# Patient Record
Sex: FEMALE | Race: OTHER
[De-identification: names, ages, dates, MRNs, and addresses within clinical notes are randomized per-mention and may not be internally consistent; named-entity substitution may affect disease eponyms.]

---

## 2018-04-23 ENCOUNTER — HOSPITAL ENCOUNTER (EMERGENCY)
Dept: HOSPITAL 62 - ER | Age: 43
Discharge: HOME | End: 2018-04-23
Payer: OTHER GOVERNMENT

## 2018-04-23 VITALS — SYSTOLIC BLOOD PRESSURE: 171 MMHG | DIASTOLIC BLOOD PRESSURE: 99 MMHG

## 2018-04-23 DIAGNOSIS — I10: ICD-10-CM
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
## 2018-04-23 DIAGNOSIS — M25.562: ICD-10-CM

## 2018-04-23 DIAGNOSIS — M25.532: ICD-10-CM
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
## 2018-04-23 DIAGNOSIS — S69.90XA: ICD-10-CM

## 2018-04-23 DIAGNOSIS — Z91.048: ICD-10-CM

## 2018-04-23 DIAGNOSIS — M25.531: ICD-10-CM

## 2018-04-23 DIAGNOSIS — R51: ICD-10-CM

## 2018-04-23 DIAGNOSIS — S80.212A: Primary | ICD-10-CM
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
## 2018-04-23 LAB
ADD MANUAL DIFF: NO
ALBUMIN SERPL-MCNC: 4.8 G/DL (ref 3.5–5)
ALP SERPL-CCNC: 100 U/L (ref 38–126)
ALT SERPL-CCNC: 26 U/L (ref 9–52)
ANION GAP SERPL CALC-SCNC: 17 MMOL/L (ref 5–19)
APPEARANCE UR: (no result)
APTT PPP: YELLOW S
AST SERPL-CCNC: 20 U/L (ref 14–36)
BASOPHILS # BLD AUTO: 0.1 10^3/UL (ref 0–0.2)
BASOPHILS NFR BLD AUTO: 1 % (ref 0–2)
BILIRUB DIRECT SERPL-MCNC: 0.3 MG/DL (ref 0–0.4)
BILIRUB SERPL-MCNC: 0.5 MG/DL (ref 0.2–1.3)
BILIRUB UR QL STRIP: NEGATIVE
BUN SERPL-MCNC: 15 MG/DL (ref 7–20)
CALCIUM: 10.1 MG/DL (ref 8.4–10.2)
CHLORIDE SERPL-SCNC: 103 MMOL/L (ref 98–107)
CO2 SERPL-SCNC: 25 MMOL/L (ref 22–30)
EOSINOPHIL # BLD AUTO: 0.2 10^3/UL (ref 0–0.6)
EOSINOPHIL NFR BLD AUTO: 2.4 % (ref 0–6)
ERYTHROCYTE [DISTWIDTH] IN BLOOD BY AUTOMATED COUNT: 17 % (ref 11.5–14)
GLUCOSE SERPL-MCNC: 82 MG/DL (ref 75–110)
GLUCOSE UR STRIP-MCNC: NEGATIVE MG/DL
HCT VFR BLD CALC: 35.8 % (ref 36–47)
HGB BLD-MCNC: 11.1 G/DL (ref 12–15.5)
KETONES UR STRIP-MCNC: NEGATIVE MG/DL
LYMPHOCYTES # BLD AUTO: 1.9 10^3/UL (ref 0.5–4.7)
LYMPHOCYTES NFR BLD AUTO: 27.9 % (ref 13–45)
MCH RBC QN AUTO: 21.9 PG (ref 27–33.4)
MCHC RBC AUTO-ENTMCNC: 30.9 G/DL (ref 32–36)
MCV RBC AUTO: 71 FL (ref 80–97)
MONOCYTES # BLD AUTO: 0.4 10^3/UL (ref 0.1–1.4)
MONOCYTES NFR BLD AUTO: 5.8 % (ref 3–13)
NEUTROPHILS # BLD AUTO: 4.2 10^3/UL (ref 1.7–8.2)
NEUTS SEG NFR BLD AUTO: 62.9 % (ref 42–78)
NITRITE UR QL STRIP: NEGATIVE
PH UR STRIP: 5 [PH] (ref 5–9)
PLATELET # BLD: 302 10^3/UL (ref 150–450)
POTASSIUM SERPL-SCNC: 4.2 MMOL/L (ref 3.6–5)
PROT SERPL-MCNC: 8.6 G/DL (ref 6.3–8.2)
PROT UR STRIP-MCNC: NEGATIVE MG/DL
RBC # BLD AUTO: 5.07 10^6/UL (ref 3.72–5.28)
SODIUM SERPL-SCNC: 144.5 MMOL/L (ref 137–145)
SP GR UR STRIP: 1.03
TOTAL CELLS COUNTED % (AUTO): 100 %
UROBILINOGEN UR-MCNC: 2 MG/DL (ref ?–2)
WBC # BLD AUTO: 6.7 10^3/UL (ref 4–10.5)

## 2018-04-23 PROCEDURE — 93010 ELECTROCARDIOGRAM REPORT: CPT

## 2018-04-23 PROCEDURE — 81001 URINALYSIS AUTO W/SCOPE: CPT

## 2018-04-23 PROCEDURE — 85025 COMPLETE CBC W/AUTO DIFF WBC: CPT

## 2018-04-23 PROCEDURE — 99284 EMERGENCY DEPT VISIT MOD MDM: CPT

## 2018-04-23 PROCEDURE — 80053 COMPREHEN METABOLIC PANEL: CPT

## 2018-04-23 PROCEDURE — 70450 CT HEAD/BRAIN W/O DYE: CPT

## 2018-04-23 PROCEDURE — 81025 URINE PREGNANCY TEST: CPT

## 2018-04-23 PROCEDURE — 36415 COLL VENOUS BLD VENIPUNCTURE: CPT

## 2018-04-23 PROCEDURE — 93005 ELECTROCARDIOGRAM TRACING: CPT

## 2018-04-23 NOTE — RADIOLOGY REPORT (SQ)
EXAM DESCRIPTION:  CT HEAD WITHOUT



COMPLETED DATE/TIME:  4/23/2018 3:06 pm



REASON FOR STUDY:  fall



COMPARISON:  None.



TECHNIQUE:  Axial images acquired through the brain without intravenous contrast.  Images reviewed wi
th bone, brain and subdural windows.  Additional sagittal and coronal reconstructions were generated.
 Images stored on PACS.

All CT scanners at this facility use dose modulation, iterative reconstruction, and/or weight based d
osing when appropriate to reduce radiation dose to as low as reasonably achievable (ALARA).

[facility]: Dose Right  [facility]: CareDose    [facility]: Dose Right    [facility]: Teradose 4D    [facility]: Smart Technologies



RADIATION DOSE:  CT Rad equipment meets quality standard of care and radiation dose reduction techniq
ues were employed. CTDIvol: 53.2 mGy. DLP: 1017 mGy-cm. mGy.



LIMITATIONS:  None.



FINDINGS:  VENTRICLES: Normal size and contour.

CEREBRUM: No masses.  No hemorrhage.  No midline shift.  No evidence for acute infarction. Normal gra
y/white matter differentiation. No areas of low density in the white matter.

CEREBELLUM: No masses.  No hemorrhage.  No alteration of density.  No evidence for acute infarction.

EXTRAAXIAL SPACES: No fluid collections.  No masses.

ORBITS AND GLOBE: No intra- or extraconal masses.  Normal contour of globe without masses.

CALVARIUM: No fracture.

PARANASAL SINUSES: No fluid or mucosal thickening.

SOFT TISSUES: No mass or hematoma.

OTHER: No other significant finding.



IMPRESSION:  NORMAL BRAIN CT WITHOUT CONTRAST.

EVIDENCE OF ACUTE STROKE: NO.



COMMENT:  Quality ID # 436: Final reports with documentation of one or more dose reduction techniques
 (e.g., Automated exposure control, adjustment of the mA and/or kV according to patient size, use of 
iterative reconstruction technique)



TECHNICAL DOCUMENTATION:  JOB ID:  [id number]

 2011 [employer]- All Rights Reserved



Reading location - IP/workstation name: [email or web address]

## 2018-04-23 NOTE — RADIOLOGY REPORT (SQ)
EXAM DESCRIPTION:  WRIST BILATERAL 3 VIEWS



COMPLETED DATE/TIME:  4/23/2018 3:23 pm



REASON FOR STUDY:  fall



COMPARISON:  None.



NUMBER OF VIEWS:  Three views.



TECHNIQUE:  AP, lateral, and oblique radiographic images acquired of the right and left wrist.



LIMITATIONS:  None.



FINDINGS:  MINERALIZATION: Normal.

BONES: No acute fracture or dislocation.  No worrisome bone lesions.  Normal alignment.

SOFT TISSUES: No soft tissue swelling.  No foreign body.

OTHER: No other significant finding.



IMPRESSION:  NEGATIVE STUDY OF THE RIGHT AND LEFT WRISTS. NO RADIOGRAPHIC EVIDENCE OF ACUTE INJURY.



TECHNICAL DOCUMENTATION:  JOB ID:  [id number]

 2011 [employer]- All Rights Reserved



Reading location - IP/workstation name: [email or web address]

## 2018-04-23 NOTE — RADIOLOGY REPORT (SQ)
EXAM DESCRIPTION:  KNEE LEFT 3 VIEWS



COMPLETED DATE/TIME:  4/23/2018 3:23 pm



REASON FOR STUDY:  fall



COMPARISON:  None.



NUMBER OF VIEWS:  Three views.



TECHNIQUE:  AP, lateral, and sunrise patella radiographic images acquired of the left knee.



LIMITATIONS:  None.



FINDINGS:  MINERALIZATION: Normal.

BONES: No acute fracture or dislocation.  No worrisome bone lesions.

JOINT: No effusion.

SOFT TISSUES: No soft tissue swelling.  No radio-opaque foreign body.

OTHER: No other significant finding.



IMPRESSION:  NEGATIVE STUDY OF THE LEFT KNEE. NO RADIOGRAPHIC EVIDENCE OF ACUTE INJURY.



TECHNICAL DOCUMENTATION:  JOB ID:  [id number]

 2011 [employer]- All Rights Reserved



Reading location - IP/workstation name: [email or web address]

## 2018-04-23 NOTE — ER DOCUMENT REPORT
ED Fall





- General


Chief Complaint: Fall


Stated Complaint: FALL/HEAD INJURY


Time Seen by Provider: 04/23/18 11:22


Notes: 


Patient is a 42-year-old female, past medical history hypertension (off her BP 

meds after diet control in past), presents after she was walking upstairs, fell 

and hit the back of her head, both wrists and left knee.  She is unsure she had 

LOC.  She is complaining of a mild headache, but denies focal weakness, numbness

, tingling, difficulty walking, neck pain, chest pain, shortness of breath, 

fevers, back pain or abdominal pain.


TRAVEL OUTSIDE OF THE [place] IN LAST 30 DAYS: No





- Related data


Allergies/Adverse Reactions: 


 





cat dander Allergy (Verified 04/23/18 10:50)


 


ferrous fumarate [From Ferretts] Allergy (Verified 04/23/18 10:50)


 











Past Medical History





- General


Information source: Patient





- Social History


Smoking Status: Never Smoker


Chew tobacco use (# tins/day): No


Frequency of alcohol use: None


Drug Abuse: None


Family History: Reviewed & Not Pertinent


Patient has suicidal ideation: No


Patient has homicidal ideation: No


Renal/ Medical History: Denies: Hx Peritoneal Dialysis





Review of Systems





- Review of Systems


Notes: 


REVIEW OF SYSTEMS:


CONSTITUTIONAL: -fevers, -chills


EENT: -eye pain, -difficulty swallowing, -nasal congestion


CARDIOVASCULAR: -chest pain, -syncope.


RESPIRATORY: -cough, -SOB


GASTROINTESTINAL: -abdominal pain, -nausea, -vomiting, -diarrhea


GENITOURINARY: -dysuria, -hematuria


MUSCULOSKELETAL: +B/L wrist pain, +left knee pain, -back pain, -neck pain


SKIN: -rash or skin lesions.


HEMATOLOGIC: -easy bruising or bleeding.


LYMPHATIC: -swollen, enlarged glands.


NEUROLOGICAL: -altered mental status or loss of consciousness, +headache, -

neurologic symptoms


PSYCHIATRIC: -anxiety, -depression.


ALL OTHER SYSTEMS REVIEWED AND NEGATIVE.





Physical Exam





- Vital signs


Vitals: 


 











Temp Pulse Resp BP Pulse Ox


 


 97.9 F   65   18   181/102 H  100 


 


 04/23/18 10:52  04/23/18 10:52  04/23/18 10:52  04/23/18 10:52  04/23/18 10:52














- Notes


Notes: 


PHYSICAL EXAMINATION:


GENERAL: Well-appearing, well-nourished and in no acute distress.


HEAD: Atraumatic, normocephalic.


EYES: Pupils equal round and reactive to light, extraocular movements intact, 

sclera anicteric, conjunctiva are normal.


ENT: nares patent, oropharynx clear without exudates.  Moist mucous membranes.


NECK: Normal range of motion, supple without lymphadenopathy


LUNGS: Breath sounds clear to auscultation bilaterally and equal.  No wheezes 

rales or rhonchi.


HEART: Regular rate and rhythm without murmurs


ABDOMEN: Soft, nontender, normoactive bowel sounds.  No guarding, no rebound.  

No masses appreciated.


EXTREMITIES: Abrasion over left anterior knee. Mild tenderness over distal 

radius. bilaterally. Normal range of motion, no pitting or edema.  No cyanosis.


NEUROLOGICAL: Cranial nerves grossly intact.  Normal speech, normal gait.  

Normal sensory and motor exams.


PSYCH: Normal mood, normal affect.


SKIN: Warm, Dry, normal turgor, no rashes or lesions noted.





Course





- Re-evaluation


Re-evalutation: 


Patient appears well and her headache is improved with Tylenol.  CT head, 

bilateral wrist x-rays and left knee x-rays do not show any acute 

abnormalities.  Rest of blood work and EKG are unremarkable.  Using the [place] syncope guidelines, she is low risk for serious outcomes and is safe 

for outpatient workup of her possible syncopal event.  Instructed her to have 

her blood pressure repeated by her primary care physician as it is elevated 

here in the ER.  Given very strict return precautions and she understands





- Vital Signs


Vital signs: 


 











Temp Pulse Resp BP Pulse Ox


 


 98.3 F   71   16   171/99 H  100 


 


 04/23/18 15:39  04/23/18 15:39  04/23/18 11:15  04/23/18 15:39  04/23/18 15:39














- Laboratory


Result Diagrams: 


 04/23/18 12:10





 04/23/18 12:10


Laboratory results interpreted by me: 


 











  04/23/18 04/23/18 04/23/18





  12:10 12:10 12:10


 


Hgb  11.1 L  


 


Hct  35.8 L  


 


MCV  71 L  


 


MCH  21.9 L  


 


MCHC  30.9 L  


 


RDW  17.0 H  


 


Total Protein   8.6 H 


 


Urine Urobilinogen    2.0 H


 


Ur Leukocyte Esterase    SMALL H














- Diagnostic Test


Radiology reviewed: Image reviewed, Reports reviewed


Radiology results interpreted by me: 


CT Head: NAD


B/L wrist x-rays: NAD


Left knee x-ray: NAD





Discharge





- Discharge


Clinical Impression: 


Fall


Qualifiers:


 Encounter type: initial encounter Qualified Code(s): W19.XXXA - Unspecified 

fall, initial encounter





Wrist injuries


Qualifiers:


 Encounter type: initial encounter Laterality: unspecified laterality Qualified 

Code(s): S69.90XA - Unspecified injury of unspecified wrist, hand and finger(s)

, initial encounter





Abrasion, knee


Qualifiers:


 Encounter type: initial encounter Laterality: left Qualified Code(s): S80.212A 

- Abrasion, left knee, initial encounter





Condition: Stable


Disposition: HOME, SELF-CARE


Additional Instructions: 


Contusion





     Your injury has resulted in a contusion -- a crushing of the deep tissues.

  No injury to important structures was detected during the physician's exam.  

Contusions vary in the amount of pain they cause, and in the length of time 

required for healing.  Typically, the area will become bruised, and will remain 

painful to touch for two or three weeks.  However, most patients are back to 

working and playing within a few days.


     After the initial period of rest and cold-packs, your symptoms (together 

with the doctor's recommendations) will determine how rapidly you can get back 

to full activity.  Usually this means "do what feels okay, but don't do things 

that hurt."


     If re-examination was recommended, it's important to follow up as 

instructed.  Call the doctor or return any time if pain increases, if swelling 

becomes severe, if you develop numbness or weakness in an injured extremity, or 

if any other alarming symptoms occur.





SYNCOPAL EPISODE:





     Syncope (fainting or near-fainting) can occur from many different health 

problems.  Or it can be a simple fainting spell requiring no treatment.  It is 

safe for you to go home, but further evaluation will likely be necessary.


     Your work-up may include tests for internal bleeding, heart disease, 

medication problems, or near-strokes.  Tests are not always required, however, 

depending on the nature of your problem.


     The warning signs of an impending faint include: dizziness, lightheadedness

, nausea, hot flashes, tingling, and weakness.  If this happens, lay down and 

put your feet up, then wait until all of these symptoms have passed before 

standing up again.


     If these episodes become recurrent, or if you develop chest pain, heart 

palpitations, mental confusion, blurred vision, or headache, then you should 

call the physician, or go to the emergency room.





NORMAL EXAM AND WORKUP:


     At this time, your examination and workup show no significant abnormality.

  No significant abnormal physical findings were noted.  All laboratory, EKG, 

and imaging (x-ray, CT scans, ultrasound) studies that were ordered show no 

significant abnormality.


     Although your examination and all studies that were ordered showed no 

significant abnormal finding, there are no examinations and no studies that are 

100% accurate.  There is always the possibility that some abnormality could 

exist and not be detected with physical examination or within the limits and 

capabilities of laboratory and other studies.


     You should return or follow up as you were instructed on your visit today 

for further evaluation if your symptoms do not resolve.








FOLLOW-UP CARE:


If you have been referred to a physician for follow-up care, call the physician

s office for an appointment as you were instructed or within the next two days.

  If you experience worsening or a significant change in your symptoms, notify 

the physician immediately or return to the Emergency Department at any time for 

re-evaluation.





Forms:  Elevated Blood Pressure


Referrals: 


REAGAN GUAN MD [ACTIVE STAFF] - Follow up as needed

## 2018-04-23 NOTE — ER DOCUMENT REPORT
ED Medical Screen (RME)





- General


Chief Complaint: Fall


Stated Complaint: FALL/HEAD INJURY


Time Seen by Provider: 04/23/18 11:22


Notes: 





Patient states that she was feeling fine and began walking up some steps then 

she says she ended up on the ground.


 She states she now feels dizzy and has a headache as well as some wrist pain.  

She is unsure if she lost her balance or had a "blackout spell".  She states 

that she stopped her high blood pressure medicine several months ago because 

they told her it was under control.


TRAVEL OUTSIDE OF THE [place] IN LAST 30 DAYS: No





- Related Data


Allergies/Adverse Reactions: 


 





cat dander Allergy (Verified 04/23/18 10:50)


 


ferrous fumarate [From Ferretts] Allergy (Verified 04/23/18 10:50)


 











Past Medical History





- Social History


Chew tobacco use (# tins/day): No


Frequency of alcohol use: None


Drug Abuse: None


Renal/ Medical History: Denies: Hx Peritoneal Dialysis





Physical Exam





- Vital signs


Vitals: 





 











Temp Pulse Resp BP Pulse Ox


 


 97.9 F   65   18   181/102 H  100 


 


 04/23/18 10:52  04/23/18 10:52  04/23/18 10:52  04/23/18 10:52  04/23/18 10:52














Course





- Vital Signs


Vital signs: 





 











Temp Pulse Resp BP Pulse Ox


 


 97.9 F   65   16   181/102 H  100 


 


 04/23/18 10:52  04/23/18 10:52  04/23/18 11:15  04/23/18 10:52  04/23/18 10:52

## 2019-04-09 ENCOUNTER — HOSPITAL ENCOUNTER (EMERGENCY)
Dept: HOSPITAL 62 - ER | Age: 44
Discharge: HOME | End: 2019-04-09
Payer: COMMERCIAL

## 2019-04-09 VITALS — SYSTOLIC BLOOD PRESSURE: 128 MMHG | DIASTOLIC BLOOD PRESSURE: 84 MMHG
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
## 2019-04-09 DIAGNOSIS — S50.11XA: ICD-10-CM

## 2019-04-09 DIAGNOSIS — S60.222A: ICD-10-CM

## 2019-04-09 DIAGNOSIS — S30.0XXA: Primary | ICD-10-CM

## 2019-04-09 DIAGNOSIS — M53.3: ICD-10-CM

## 2019-04-09 DIAGNOSIS — S60.221A: ICD-10-CM

## 2019-04-09 PROCEDURE — 99283 EMERGENCY DEPT VISIT LOW MDM: CPT

## 2019-04-09 PROCEDURE — 72220 X-RAY EXAM SACRUM TAILBONE: CPT

## 2019-04-09 NOTE — RADIOLOGY REPORT (SQ)
EXAM DESCRIPTION:  HAND BILATERAL 3 VIEWS



COMPLETED DATE/TIME:  4/9/2019 4:16 pm



REASON FOR STUDY:  Fall yesterday pain in coccyx right arm and both h



COMPARISON:  None.



EXAM PARAMETERS:  NUMBER OF VIEWS: Three views.

TECHNIQUE: AP, lateral and oblique  radiographic images acquired of the right and left hand.

LIMITATIONS: None.



FINDINGS:  MINERALIZATION: Normal.

BONES: No acute fracture or dislocation.  No worrisome bone lesions.

JOINTS: No effusions.

SOFT TISSUES: No soft tissue swelling.  No foreign body.

OTHER: No other significant finding.



IMPRESSION:  NEGATIVE STUDY OF THE RIGHT AND LEFT HANDS. NO RADIOGRAPHIC EVIDENCE OF ACUTE INJURY.



TECHNICAL DOCUMENTATION:  JOB ID:  [id number]

 2011 [employer]- All Rights Reserved



Reading location - IP/workstation name: [email or web address]

## 2019-04-09 NOTE — ER DOCUMENT REPORT
ED Fall





- General


Chief Complaint: Fall


Stated Complaint: FALL/TAIL BONE PAIN


Time Seen by Provider: 04/09/19 15:28


Mode of Arrival: Ambulatory


Information source: Patient


Notes: 





43-year-old female presents to ED for complaint of pain to her coccyx area.  She

states she went to the court yesterday to follow case and she pulled her chair 

and had wheels on it.  She states when she went to sit down the chair rolled out

from under her and she landed on the ground on her buttocks.  She states that 

her arm hit the arm of the chair and her hands are both numb from hitting the 

ground.  She has full range of motion's of her shoulders elbows and hands.  She 

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 

that she needed to file.  She states she was told by several people she needed 

to get followed up to make sure nothing was cracked.  She states she took 

Tylenol and continued with her day.  She states last night when she went to bed 

she had to sleep on her abdomen due to the pain in her coccyx area.  She states 

she was called by the [facility] today and told she need to get checked out because 

somebody also fallen out of that same chair they have been told to remove the 

wheels and they did not.  She states she came to the emergency room to get 

checked out to make sure she had not cracked her tailbone as it does continue to

hurt.  She states she also has pains in her right arm and both hands but has 

full strength in the hand and arm.  Get a x-ray of the lumbar sacral area.  She 

was provided with ice pack at this time.


TRAVEL OUTSIDE OF THE [place] IN LAST 30 DAYS: No





- HPI


Occurred: Yesterday


Where: Public place


Context: Fell from sitting - Fell when trying to sit in a chair and it rolled 

out from under her


Associated symptoms: None


Location of injury/pain: Buttocks - Coccyx area, Upper extremity - Right arm in 

both hands


Quality of pain: Sharp


Severity: Moderate


Pain Level: 3





- Related data


Allergies/Adverse Reactions: 


                                        





cat dander Allergy (Verified 04/09/19 14:55)


   


ferrous fumarate [From Ferretts] Allergy (Verified 04/09/19 14:55)


   











Past Medical History





- General


Information source: Patient





- Social History


Smoking Status: Never Smoker


Chew tobacco use (# tins/day): No


Frequency of alcohol use: Rare


Drug Abuse: None


Occupation: Owns a [employer] company


Lives with: Family


Family History: Reviewed & Not Pertinent


Patient has suicidal ideation: No


Patient has homicidal ideation: No





- Past Medical History


Cardiac Medical History: Reports: Hx Hypertension - No longer has after losing 

weight


Pulmonary Medical History: Reports: None


EENT Medical History: Reports: None


Neurological Medical History: Reports: None


Endocrine Medical History: Reports: Other - Gestational diabetes


Renal/ Medical History: Reports: None


Malignancy Medical History: Reports: None


GI Medical History: Reports: None


Musculoskeletal Medical History: Reports None


Skin Medical History: Reports None


Psychiatric Medical History: Reports: None


Traumatic Medical History: Reports: None


Infectious Medical History: Reports: None


Past Surgical History: Reports: Hx Gastric Bypass Surgery, Hx Oral Surgery - 

[place] teeth





Review of Systems





- Review of Systems


Constitutional: No symptoms reported


EENT: No symptoms reported


Cardiovascular: No symptoms reported


Respiratory: No symptoms reported


Gastrointestinal: No symptoms reported


Genitourinary: No symptoms reported


Female Genitourinary: No symptoms reported


Musculoskeletal: Back pain - Coccyx pain, Other - Right forearm tenderness and 

bilateral hand pain


Skin: No symptoms reported


Hematologic/Lymphatic: No symptoms reported


Neurological/Psychological: No symptoms reported


-: Yes All other systems reviewed and negative





Physical Exam





- Vital signs


Vitals: 


                                        











Temp Pulse Resp BP Pulse Ox


 


 98.5 F   78   16   138/90 H  100 


 


 04/09/19 15:01  04/09/19 15:01  04/09/19 15:01  04/09/19 15:01  04/09/19 15:01











Interpretation: Normal





- General


General appearance: Appears well, Alert





- HEENT


Head: Normocephalic, Atraumatic


Eyes: Normal


Pupils: PERRL





- Respiratory


Respiratory status: No respiratory distress


Chest status: Nontender


Breath sounds: Normal


Chest palpation: Normal





- Cardiovascular


Rhythm: Regular


Heart sounds: Normal auscultation


Murmur: No





- Abdominal


Inspection: Normal


Distension: No distension


Bowel sounds: Normal


Tenderness: Nontender


Organomegaly: No organomegaly





- Back


Back: Normal, Tender, Vertebra tenderness - Coccyx area


Notes: 





No signs or symptoms of cauda equina, no complaint of loss of control of bowel 

bladder no saddle anesthesia no loss of control or sensation to the lower 

extremities.  She walks with a even steady gait.





- Extremities


General upper extremity: Normal inspection, Normal color, Normal ROM, Normal 

temperature


General lower extremity: Normal inspection, Nontender, Normal color, Normal ROM,

Normal temperature, Normal weight bearing.  No: Haroon's sign


Shoulder: Normal, Nontender


Arm: Normal, Nontender


Elbow: Normal, Nontender


Forearm: Tender.  No: Abrasion, Deformity, Ecchymosis, Instability, Laceration, 

Other


Wrist: Tender.  No: Abrasion, Axial load of thumb pain, Deformity, Dislocation, 

Ecchymosis, Instability, Laceration, Limited ROM, Navicular tenderness


Hand: Tender, No evidence of human bite, No evidence of FB.  No: Abrasion, 

Deformity, Dislocation, Ecchymosis, Instability, Laceration, Swelling, Tendon 

deficit





- Neurological


Neuro grossly intact: Yes


Cognition: Normal


Orientation: AAOx4


[place] Coma Scale Eye Opening: Spontaneous


[place] Coma Scale Verbal: Oriented


[place] Coma Scale Motor: Obeys Commands


[place] Coma Scale Total: 15


Speech: Normal


Motor strength normal: LUE, RUE, LLE, RLE


Sensory: Normal





- Psychological


Associated symptoms: Normal affect, Normal mood





- Skin


Skin Temperature: Warm


Skin Moisture: Dry


Skin Color: Normal





Course





- Re-evaluation


Re-evalutation: 





04/09/19 21:22


After performing a Medical Screening Examination, I estimate there is LOW risk 

for EXPANDING OR RUPTURED ABDOMINAL AORTIC ANEURYSM, CAUDA EQUINA SYNDROME, E

PIDURAL MASS LESION, or HERNIATED DISK CAUSING SEVERE SPINAL STENOSIS, thus I 

consider the discharge disposition reasonable.  I have reevaluated this patient 

multiple times and no significant life threatening changes are noted. The 

patient  and I have discussed the diagnosis and risks, and we agree with 

discharging home and close follow-up. We also discussed returning to the 

Emergency Department immediately if new or worsening symptoms occur with the 

understanding that symptoms and presentations can change. We have discussed the 

symptoms which are most concerning (e.g., saddle anesthesia, urinary or bowel 

incontinence or retention, changing or worsening pain) that necessitate 

immediate return.





- Vital Signs


Vital signs: 


                                        











Temp Pulse Resp BP Pulse Ox


 


 98.5 F   74   16   128/84 H  100 


 


 04/09/19 17:50  04/09/19 17:50  04/09/19 17:50  04/09/19 17:50  04/09/19 17:50














- Diagnostic Test


Radiology reviewed: Image reviewed, Reports reviewed





Discharge





- Discharge


Clinical Impression: 


 Contusion of right forearm, initial encounter





Fall


Qualifiers:


 Encounter type: initial encounter Qualified Code(s): W19.XXXA - Unspecified 

fall, initial encounter





Contusion of buttock


Qualifiers:


 Encounter type: initial encounter Qualified Code(s): S30.0XXA - Contusion of 

lower back and pelvis, initial encounter





Contusion


Qualifiers:


 Encounter type: initial encounter Contusion area: hand Laterality: unspecified 

laterality Qualified Code(s): S60.229A - Contusion of unspecified hand, initial 

encounter





Condition: Stable


Disposition: HOME, SELF-CARE


Instructions:  Family Physicians / Practices


Additional Instructions: 


CONTUSION:


    Your injury has resulted in a contusion -- a crushing of the deep tissues.  

No injury to important structures was detected during the physician's exam.  

Contusions vary in the amount of pain they cause, and in the length of time 

required for healing.  Typically, the area will become bruised, and will remain 

painful to touch for two or three weeks.  However, most patients are back to 

working and playing within a few days.


     After the initial period of rest and cold-packs, your symptoms (together 

with the doctor's recommendations) will determine how rapidly you can get back 

to full activity.  Usually this means "do what feels okay, but don't do things 

that hurt."


     If re-examination was recommended, it's important to follow up as 

instructed.  Call the doctor or return any time if pain increases, if swelling 

becomes severe, if you develop numbness or weakness in an injured extremity, or 

if any other alarming symptoms occur.








USE OF TYLENOL (ACETAMINOPHEN):


     Acetaminophen may be taken for pain relief or fever control. It's much 

safer than aspirin, offering a wider range of "safe" dosages.  It is safe during

pregnancy.  Some brand names are Tylenol, Panadol, Datril, Anacin 3, Tempra, and

Liquiprin. Acetaminophen can be repeated every four hours.  The following are 

maximum recommended dosages:





WEIGHT         Dose             Drops                  Elixir        

Chewable(80mg)


(LBS.)                            drprs=droppers    tsp=teaspoon


6               40 mg            0.4 ml (1/2)


6-11            80 mg            0.8 ml (full)              tsp                

 1       tab


12-16         120 mg           1 1/2 drprs             3/4  tsp               1 

1/2  tabs


17-23         160 mg             2  drprs             1    tsp                  

2       tabs


24-30         240 mg             3  drprs             1 1/2 tsp                3

      tabs


30-35         320 mg                                       2    tsp             

     4       tabs


36-41         360 mg                                       2 1/4   tsp          

   4 1/2 tabs


42-47         400 mg                                       2 1/2   tsp          

   5      tabs


48-53         480 mg                                       3    tsp             

     6      tabs


54-59         520 mg                                       3  1/4  tsp          

   6 1/2 tabs


60-64         560 mg                                       3  1/2  tsp          

   7      tabs 


65-70         600 mg                                       3  3/4  tsp          

   7 1/2 tabs


71-76         640 mg                                       4   tsp              

    8      tabs


77-82         720 mg                                       4 1/2   tsp          

  9      tabs


83-88         800 mg                                       5   tsp              

  10      tabs





>89 pounds or adults          650 mg to 900 mg





Acetaminophen can be repeated every four hours.  Maximum dose not to exceed 4000

mg a day.





   These maximum recommended dosages are slightly higher than the dosages 

written on the product container, but these dosages are very safe and below the 

toxic dosage for acetaminophen.





ICE PACKS:


     Apply ice packs frequently against the painful area.  Many different 

schedules are recommended, such as "20 minutes on, 20 minutes off" or "one hour 

ice, two hours rest."  If you need to work, you may need to go longer between 

ice treatments.  You should plan to have the area ice packed AT LEAST one fourth

of the time.


     The ice should be applied over the wrap, tape, or splint, or over a layer 

of cloth -- not directly against the skin.  Some ice bags have a built-in cloth 

and can be put directly on the skin.


Ibuprofen





     Ibuprofen is an excellent, safe drug for pain control.  In addition, it has

potent antiinflammatory effects which are beneficial, especially in the 

treatment of injuries, arthritis, or tendonitis. It's best to take ibuprofen 

with food.  Persons with ulcer disease or allergy to aspirin should notify their

physician of this before taking ibuprofen.


     Take the medication exactly as prescribed.  Don't take additional doses 

unless instructed to do so by your doctor.  If you develop wheezing, shortness 

of breath, hives, faintness, stomach pain, vomiting, or dark black stools, 

return for re-evaluation at once.





FOLLOW-UP CARE:


If you have been referred to a physician for follow-up care, call the 

physicians office for an appointment as you were instructed or within the next 

two days.  If you experience worsening or a significant change in your symptoms,

notify the physician immediately or return to the Emergency Department at any 

time for re-evaluation.


Prescriptions: 


Ibuprofen [Motrin 800 mg Tablet] 800 mg PO Q8H PRN #30 tab


 PRN Reason: 


Forms:  Elevated Blood Pressure

## 2019-04-09 NOTE — RADIOLOGY REPORT (SQ)
EXAM DESCRIPTION:  SACRUM AND COCCYX



COMPLETED DATE/TIME:  4/9/2019 4:16 pm



REASON FOR STUDY:  fall yesterday pain in coccyx



COMPARISON:  None.



NUMBER OF VIEWS:  Three views.



TECHNIQUE:  AP, lateral, and tilt views of the sacrum and coccyx.



LIMITATIONS:  None.



FINDINGS:  MINERALIZATION: Normal.

BONES: No acute fracture or dislocation.  No worrisome bone lesions.

SOFT TISSUES: No soft tissue swelling.  No foreign body.

OTHER: No other significant finding.



IMPRESSION:  NEGATIVE STUDY OF THE SACRUM AND COCCYX.



TECHNICAL DOCUMENTATION:  JOB ID:  [id number]

 2011 [employer]- All Rights Reserved



Reading location - IP/workstation name: [email or web address]

## 2019-04-09 NOTE — RADIOLOGY REPORT (SQ)
EXAM DESCRIPTION:  FOREARM RIGHT



COMPLETED DATE/TIME:  4/9/2019 4:16 pm



REASON FOR STUDY:  Fall yesterday pain in coccyx right arm and both h



COMPARISON:  None.



NUMBER OF VIEWS:  Two views.



TECHNIQUE:  Two radiographic images acquired of the right forearm, including elbow and wrist in at le
ast one projection.



LIMITATIONS:  None.



FINDINGS:  MINERALIZATION: Normal.

BONES: No acute fracture.  No worrisome bone lesions.

SOFT TISSUES: No obvious swelling or foreign body.

OTHER: No other significant finding.



IMPRESSION:  NEGATIVE STUDY OF THE RIGHT FOREARM. NO RADIOGRAPHIC EVIDENCE OF ACUTE INJURY.



TECHNICAL DOCUMENTATION:  JOB ID:  [id number]

 2011 [employer]- All Rights Reserved



Reading location - IP/workstation name: [email or web address]

## 2019-11-25 ENCOUNTER — HOSPITAL ENCOUNTER (EMERGENCY)
Dept: HOSPITAL 62 - ER | Age: 44
Discharge: HOME | End: 2019-11-25
Payer: OTHER GOVERNMENT

## 2019-11-25 VITALS — SYSTOLIC BLOOD PRESSURE: 145 MMHG | DIASTOLIC BLOOD PRESSURE: 93 MMHG

## 2019-11-25 DIAGNOSIS — R07.89: Primary | ICD-10-CM

## 2019-11-25 DIAGNOSIS — Z91.048: ICD-10-CM

## 2019-11-25 DIAGNOSIS — R42: ICD-10-CM

## 2019-11-25 DIAGNOSIS — R53.81: ICD-10-CM

## 2019-11-25 DIAGNOSIS — Z98.84: ICD-10-CM

## 2019-11-25 DIAGNOSIS — R00.2: ICD-10-CM

## 2019-11-25 DIAGNOSIS — R06.02: ICD-10-CM

## 2019-11-25 LAB
ALBUMIN SERPL-MCNC: 4.4 G/DL (ref 3.5–5)
ALP SERPL-CCNC: 92 U/L (ref 38–126)
ANION GAP SERPL CALC-SCNC: 12 MMOL/L (ref 5–19)
APPEARANCE UR: (no result)
APTT PPP: YELLOW S
AST SERPL-CCNC: 19 U/L (ref 14–36)
BARBITURATES UR QL SCN: NEGATIVE
BILIRUB DIRECT SERPL-MCNC: 0.1 MG/DL (ref 0–0.4)
BILIRUB SERPL-MCNC: 0.3 MG/DL (ref 0.2–1.3)
BILIRUB UR QL STRIP: NEGATIVE
BUN SERPL-MCNC: 15 MG/DL (ref 7–20)
CALCIUM: 9.6 MG/DL (ref 8.4–10.2)
CHLORIDE SERPL-SCNC: 107 MMOL/L (ref 98–107)
CO2 SERPL-SCNC: 24 MMOL/L (ref 22–30)
GLUCOSE SERPL-MCNC: 93 MG/DL (ref 75–110)
GLUCOSE UR STRIP-MCNC: NEGATIVE MG/DL
KETONES UR STRIP-MCNC: (no result) MG/DL
METHADONE UR QL SCN: NEGATIVE
NITRITE UR QL STRIP: NEGATIVE
PCP UR QL SCN: NEGATIVE
PH UR STRIP: 6 [PH] (ref 5–9)
POTASSIUM SERPL-SCNC: 3.9 MMOL/L (ref 3.6–5)
PROT SERPL-MCNC: 8 G/DL (ref 6.3–8.2)
PROT UR STRIP-MCNC: 30 MG/DL
SP GR UR STRIP: 1.02
URINE AMPHETAMINES SCREEN: NEGATIVE
URINE BENZODIAZEPINES SCREEN: NEGATIVE
URINE COCAINE SCREEN: NEGATIVE
URINE MARIJUANA (THC) SCREEN: NEGATIVE
UROBILINOGEN UR-MCNC: NEGATIVE MG/DL (ref ?–2)

## 2019-11-25 PROCEDURE — 96360 HYDRATION IV INFUSION INIT: CPT

## 2019-11-25 PROCEDURE — 81001 URINALYSIS AUTO W/SCOPE: CPT

## 2019-11-25 PROCEDURE — 84443 ASSAY THYROID STIM HORMONE: CPT

## 2019-11-25 PROCEDURE — 84484 ASSAY OF TROPONIN QUANT: CPT

## 2019-11-25 PROCEDURE — 93010 ELECTROCARDIOGRAM REPORT: CPT

## 2019-11-25 PROCEDURE — 83735 ASSAY OF MAGNESIUM: CPT

## 2019-11-25 PROCEDURE — 80307 DRUG TEST PRSMV CHEM ANLYZR: CPT

## 2019-11-25 PROCEDURE — 80053 COMPREHEN METABOLIC PANEL: CPT

## 2019-11-25 PROCEDURE — 71046 X-RAY EXAM CHEST 2 VIEWS: CPT

## 2019-11-25 PROCEDURE — 93005 ELECTROCARDIOGRAM TRACING: CPT

## 2019-11-25 PROCEDURE — 36415 COLL VENOUS BLD VENIPUNCTURE: CPT

## 2019-11-25 PROCEDURE — 81025 URINE PREGNANCY TEST: CPT

## 2019-11-25 PROCEDURE — 99285 EMERGENCY DEPT VISIT HI MDM: CPT

## 2019-11-25 NOTE — ER DOCUMENT REPORT
ED Medical Screen (RME)





- General


Chief Complaint: Palpitations


Stated Complaint: INCREASED HEART RATE


Time Seen by Provider: 11/25/19 13:52


Mode of Arrival: Ambulatory


Information source: Patient


TRAVEL OUTSIDE OF THE [place] IN LAST 30 DAYS: No





- HPI


Onset: Just prior to arrival


Onset/Duration: Better


Quality of pain: No pain - No pain at this time.  She did have pain for about 30

minutes., Sharp


Severity: None


Pain Level: Denies


Associated Symptoms: Chest pain - Palpitations, Dizzy/lightheaded, Vomiting, 

Other - Numbness tingling in the fingers now very exhausted


Exacerbated by: Denies


Relieved by: Denies


Similar symptoms previously: Yes


Recently seen / treated by doctor: No





- Related Data


Smoking: Non-smoker


Frequency of alcohol use: Rare


Drug Abuse: None


Allergies/Adverse Reactions: 


                                        





cat dander Allergy (Verified 04/09/19 14:55)


   


ferrous fumarate [From Ferretts] Allergy (Verified 04/09/19 14:55)


   











Past Medical History





- Past Medical History


Cardiac Medical History: Reports: Hx Hypertension - No longer has after losing 

weight


Renal/ Medical History: Denies: Hx Peritoneal Dialysis


Past Surgical History: Reports: Hx Gastric Bypass Surgery, Hx Oral Surgery - 

[place] teeth





Physical Exam





- Vital signs


Vitals: 





                                        











Temp Pulse Resp BP Pulse Ox


 


 97.8 F   77   16   136/91 H  99 


 


 11/25/19 13:30  11/25/19 13:30  11/25/19 13:30  11/25/19 13:30  11/25/19 13:30














Course





- Vital Signs


Vital signs: 





                                        











Temp Pulse Resp BP Pulse Ox


 


 97.8 F   77   16   136/91 H  99 


 


 11/25/19 13:30  11/25/19 13:30  11/25/19 13:30  11/25/19 13:30  11/25/19 13:30

## 2019-11-25 NOTE — ER DOCUMENT REPORT
ED General





- General


Chief Complaint: Palpitations


Stated Complaint: INCREASED HEART RATE


Time Seen by Provider: 11/25/19 13:52


Mode of Arrival: Ambulatory


Information source: Patient


Notes: 





44-year-old female with no reported past medical history presents with complaint

of sudden onset of dizziness, chest pain and palpitations that occurred 

approximately 8 hours prior to arrival.  Patient reports that the symptoms 

lasted approximately 20 minutes and self resolved and have not returned since 

that time.  Patient denies prior similar symptoms, recent illness, new 

medications, supplements.


TRAVEL OUTSIDE OF THE [place] IN LAST 30 DAYS: No





- HPI


Onset: This afternoon


Onset/Duration: Sudden, Gone


Quality of pain: Achy


Severity: None


Pain Level: Denies


Associated symptoms: Chest pain - Resolved, Shortness of breath - Resolved, 

Other - Dizziness, palpitations.  denies: Nausea, Vomiting


Exacerbated by: Denies


Relieved by: Denies


Similar symptoms previously: No


Recently seen / treated by doctor: No





- Related Data


Allergies/Adverse Reactions: 


                                        





cat dander Allergy (Verified 04/09/19 14:55)


   


ferrous fumarate [From Ferretts] Allergy (Verified 04/09/19 14:55)


   











Past Medical History





- General


Information source: Patient





- Social History


Smoking Status: Unknown if Ever Smoked


Frequency of alcohol use: Rare


Drug Abuse: None


Lives with: Family, Spouse/Significant other


Family History: Reviewed & Not Pertinent


Patient has suicidal ideation: No


Patient has homicidal ideation: No





- Past Medical History


Cardiac Medical History: Reports: Hx Hypertension - No longer has after losing 

weight


Renal/ Medical History: Denies: Hx Peritoneal Dialysis


Past Surgical History: Reports: Hx Gastric Bypass Surgery, Hx Oral Surgery - 

[place] teeth





Review of Systems





- Review of Systems


Constitutional: Malaise.  denies: Weakness, Recent illness


EENT: denies: Blurred vision, Throat pain, Difficulty swallowing


Cardiovascular: Chest pain, Palpitations, Heart racing, Dizziness.  denies: 

Dyspnea, Edema


Respiratory: Short of breath.  denies: Cough


Gastrointestinal: denies: Abdominal pain, Poor appetite


Genitourinary: denies: Dysuria


Female Genitourinary: No symptoms reported


Musculoskeletal: denies: Back pain


Skin: denies: Rash


Hematologic/Lymphatic: No symptoms reported


Neurological/Psychological: denies: Lost consciousness, Headaches


-: Yes All other systems reviewed and negative





Physical Exam





- Vital signs


Vitals: 


                                        











Temp Pulse Resp BP Pulse Ox


 


 97.8 F   77   16   136/91 H  99 


 


 11/25/19 13:30  11/25/19 13:30  11/25/19 13:30  11/25/19 13:30  11/25/19 13:30














- Notes


Notes: 


PHYSICAL EXAMINATION:





GENERAL: Well-appearing, well-nourished and in no acute distress.





HEAD: Atraumatic, normocephalic.





EYES: Pupils equal round and reactive to light, extraocular movements intact, 

conjunctiva are normal.





ENT: Nares patent, oropharynx clear without exudates.  Moist mucous membranes.





NECK: Normal range of motion, supple without lymphadenopathy





LUNGS: Breath sounds clear to auscultation bilaterally and equal.  No wheezes 

rales or rhonchi.





HEART: Regular rate and rhythm without murmurs





ABDOMEN: Soft, nontender, nondistended abdomen.  No guarding, no rebound.  No 

masses appreciated.





Female : deferred





Musculoskeletal: Normal range of motion, no pitting or edema.  No cyanosis.





NEUROLOGICAL: Cranial nerves grossly intact.  Normal speech, normal gait.  No

rmal sensory, motor exams





PSYCH: Normal mood, normal affect.





SKIN: Warm, Dry, normal turgor, no rashes or lesions noted.





Course





- Re-evaluation


Re-evalutation: 








Laboratory











  11/25/19 11/25/19





  14:18 14:18


 


Sodium  142.7 


 


Potassium  3.9 


 


Chloride  107 


 


Carbon Dioxide  24 


 


Anion Gap  12 


 


BUN  15 


 


Creatinine  0.81 


 


Est GFR ( Amer)  > 60 


 


Est GFR (MDRD) Non-Af  > 60 


 


Glucose  93 


 


Calcium  9.6 


 


Magnesium  1.9 


 


Total Bilirubin  0.3 


 


Direct Bilirubin  0.1 


 


Neonat Total Bilirubin  Not Reportable 


 


Neonat Direct Bilirubin  Not Reportable 


 


Neonat Indirect Bili  Not Reportable 


 


AST  19 


 


ALT  12 


 


Alkaline Phosphatase  92 


 


Troponin I   < 0.012


 


Total Protein  8.0 


 


Albumin  4.4 














                                        





Chest X-Ray  11/25/19 13:58


IMPRESSION:  NO ACUTE RADIOGRAPHIC FINDING IN THE CHEST.


 














                                        











Temp Pulse Resp BP Pulse Ox


 


 97.8 F   77   16   136/91 H  99 


 


 11/25/19 13:30  11/25/19 13:30  11/25/19 13:30  11/25/19 13:30  11/25/19 13:30








Presentation of chest pain in an otherwise well appearing patient. Low clinical 

suspicion for ACS given clinical history, exam, EKG without ST elevations or 

depressions, and negative initial troponin. HEART score less than or equal to 3.

PE also seems unlikely given clinical history, absence of tachycardia or 

dyspnea. Patient is PERC criteria negative. CXR without evidence of pneumothorax

or pneumonia. No widened mediastinum. Aortic dissection also seems unlikely 

given history, symmetric pulses, CXR, and vitals.  Patient asymptomatic upon my 

exam.





HEART Score:





History-0      


ECG-0      


Age-1      


Risk Factors-1   


Troponin-0   





Total: 2





Chest pain in a patient without evidence of cardiac or other serious etiology on

workup today. I discussed with patient that, based on their age, risk factors 

and emergency department testing today, the likelihood that their symptoms are 

related to a heart attack is very low (estimated risk of heart attack or death 

over the next 30 days of less than 1%).  The patient demonstrates decision 

making capacity and has verbalized an understanding of these risks to me. Based 

on this,  the patient has chosen to follow-up as an outpatient. Usual chest pain

return precautions reviewed. The patient states understanding and agreement with

this plan.  Patient given copy of her laboratory work obtained today.


11/25/19 21:13


Patient was evaluated and treated as appropriate for the patient's presenting 

symptoms and complaint, with consideration of any critical or life threatening 

conditions that may be associated with their obtained history and exam as noted 

above. All results were discussed with patient and... Patient provided the 

opportunity to ask questions, and express concerns. Patient was educated on 

treatments based on their presumed diagnosis as noted above.  At this time we 

will discharge the patient with return precautions and follow-up recommendation

s.  Verbal discharge instructions given a the bedside. Medication warnings 

reviewed. Patient is in agreement with this plan and has verbalized 

understanding of return precautions. 





After careful consideration I feel that that patient can be safely discharged 

from the emergency department,  they were advised to followup with a primary 

care physician in 2-3 days. 








Dictation on this chart was performed using voice recognition software and may 

result in unintended grammatical, spelling, syntax or errors.














11/25/19 21:14








- Vital Signs


Vital signs: 


                                        











Temp Pulse Resp BP Pulse Ox


 


 98.5 F   77   15   145/100 H  97 


 


 11/25/19 19:51  11/25/19 13:30  11/25/19 19:37  11/25/19 19:37  11/25/19 19:46














- Laboratory


Result Diagrams: 


                                 11/25/19 14:18


Laboratory results interpreted by me: 


                                        











  11/25/19 11/25/19





  13:28 14:18


 


TSH   0.38 L


 


Urine Protein  30 H 


 


Urine Ketones  TRACE H 


 


Ur Leukocyte Esterase  MODERATE H 














- Diagnostic Test


Radiology reviewed: Image reviewed, Reports reviewed





- EKG Interpretation by Me


EKG shows normal: Sinus rhythm


Rate: Normal


Rhythm: NSR





Discharge





- Discharge


Clinical Impression: 


 Dizziness





Chest pain


Qualifiers:


 Chest pain type: unspecified Qualified Code(s): R07.9 - Chest pain, unspecified





Condition: Good


Disposition: HOME, SELF-CARE


Instructions:  Palpitations (Irregular or Rapid Heartrate) ([facility]), Dizziness 

([facility]), Chest Pain of Unclear Cause ([facility])


Additional Instructions: 


You were seen today for chest pain.  The exact cause of your pain is unclear. 

However, based on your cardiac enzyme testing, chest x-ray, and EKG it does not 

appear that it is from an immediately life-threatening cause at this time.  

Although your testing here is normal is critical that you follow-up with your 

primary care physician for continued evaluation of this chest pain and possible 

stress testing.  I recommended you see your physician within the next 24-48 

hours to be evaluated for consideration of a stress test.  Please return to 

emergency department immediately if you have worsening of your chest pain, 

shortness of breath, vomiting, become unable to exert yourself due to pain or 

difficulty breathing, you pass out, or have any pain that radiates into your 

arms, jaw, or back. Please also return if you have any additional symptoms that 

are concerning to you.


Regarding Blood Pressure:


 


Your blood pressure was noted to be greater than 120/80 at least once in the e

mergency room today.  


It is recommended that you follow-up with her primary care physician in the next

week for repeat blood pressure check.





The Centers for Medicare and Medicaid Services has specific recommendations 

regarding a person's blood pressure.  There are several lifestyle modifications 

that are recommended in order to help lower your blood pressure.


These include:


 


Quitting smoking if you smoke.


Reducing the amount of sodium in your diet.


Getting regular exercise


Limiting alcohol to no more than 2 drinks a day for men and one drink a day for 

women.


Eating a healthy diet, including more fruits and vegetables, low fat dairy 

products, less saturated and total fat.


Losing weight if you are overweight.


 


FOLLOW-UP:


  Call your doctor's office and let them know your blood pressure was elevated 

and you were advised to get your blood pressure checked in the above time-line. 

 If you are unable to get into your doctor's office in this time period, you can

follow-up with a new physician (I have left the numbers below for a few primary 

care doctors affiliated with this \[facility]\"") or return to the ER.


 


PRIMARY CARE PHYSICIANS:


Dr. Shanti Berry 


[address], [place] [address]


[phone]


 


Dr Maddox


Address: [address] , [place] [address] 


[phone]


 


Dr Barnes


Address: [address] , [place] [address] 


[phone]


 





Forms:  Elevated Blood Pressure

## 2019-11-25 NOTE — RADIOLOGY REPORT (SQ)
EXAM DESCRIPTION:  CHEST 2 VIEWS



COMPLETED DATE/TIME:  11/25/2019 3:10 pm



REASON FOR STUDY:  palpitation, chest pain



COMPARISON:  None.



EXAM PARAMETERS:  NUMBER OF VIEWS: two views

TECHNIQUE: Digital Frontal and Lateral radiographic views of the chest acquired.

RADIATION DOSE: NA

LIMITATIONS: none



FINDINGS:  LUNGS AND PLEURA: No opacities, masses or pneumothorax. No pleural effusion.

MEDIASTINUM AND HILAR STRUCTURES: No masses or contour abnormalities.

HEART AND VASCULAR STRUCTURES: Heart normal size.  No evidence for failure.

BONES: No acute findings.

HARDWARE: None in the chest.

OTHER: No other significant finding.



IMPRESSION:  NO ACUTE RADIOGRAPHIC FINDING IN THE CHEST.



TECHNICAL DOCUMENTATION:  JOB ID:  [id number]

 2011 [employer]- All Rights Reserved



Reading location - IP/workstation name: [email or web address]